# Patient Record
Sex: MALE | Race: WHITE | Employment: FULL TIME | URBAN - NONMETROPOLITAN AREA
[De-identification: names, ages, dates, MRNs, and addresses within clinical notes are randomized per-mention and may not be internally consistent; named-entity substitution may affect disease eponyms.]

---

## 2022-07-21 ENCOUNTER — HOSPITAL ENCOUNTER (EMERGENCY)
Age: 47
Discharge: HOME | End: 2022-07-21
Attending: EMERGENCY MEDICINE
Payer: COMMERCIAL

## 2022-07-21 VITALS
SYSTOLIC BLOOD PRESSURE: 146 MMHG | HEART RATE: 90 BPM | TEMPERATURE: 97.8 F | DIASTOLIC BLOOD PRESSURE: 90 MMHG | OXYGEN SATURATION: 100 % | RESPIRATION RATE: 18 BRPM

## 2022-07-21 DIAGNOSIS — R55 PRE-SYNCOPE: Primary | ICD-10-CM

## 2022-07-21 PROCEDURE — 99282 EMERGENCY DEPT VISIT SF MDM: CPT

## 2022-07-21 ASSESSMENT — ENCOUNTER SYMPTOMS
SHORTNESS OF BREATH: 0
BACK PAIN: 0
SORE THROAT: 0
ABDOMINAL DISTENTION: 0

## 2022-07-21 NOTE — ED PROVIDER NOTES
[facility]  EMERGENCY DEPARTMENT ENCOUNTER      Pt Name: Rina English  MRN: [id number]  [place] 1975  Date of evaluation: 7/21/2022  Provider: Jennifer Manning DO    CHIEF COMPLAINT       Chief Complaint   Patient presents with    Loss of Consciousness     Was fasting for blood work at work [employer]) and afterwards had a syncopal episode, did not hit head or cause any known injury. HISTORY OF PRESENT ILLNESS   (Location/Symptom, Timing/Onset, Context/Setting, Quality, Duration, Modifying Factors, Severity)  Note limiting factors. Rina English is a 56 y.o. male who presents to the emergency department after he had a presyncopal episode while at work. Patient states that he was fasting for 19 hours for a blood draw related to their company's health insurance and was scheduled to have his blood drawn at 10:45 PM earlier tonight. He states that the blood draw went fine and he actually ate a meal after that but when he went back to go sit on his desk he had some tunnel vision he got a little sweaty and dizzy and almost passed out. He did not hit his head or have any other injuries. They gave him a lot of Gatorade and some candy and now he feels better. Patient thinks that it might be his blood sugars going low that could have caused this. He also tells me that the environment he works and is quite warm. He states that he has been drinking a lot of water throughout the day however. He denies any chest pain, shortness of breath, headache or dizziness at this time. He feels back to his baseline and would like to be discharged home. REVIEW OF SYSTEMS    (2-9 systems for level 4, 10 or more for level 5)     Review of Systems   Constitutional:  Negative for fatigue and fever. HENT:  Negative for congestion and sore throat. Eyes:  Negative for visual disturbance. Respiratory:  Negative for shortness of breath.     Cardiovascular:  Negative for chest pain and palpitations. Gastrointestinal:  Negative for abdominal distention. Genitourinary:  Negative for dysuria. Musculoskeletal:  Negative for back pain. Skin:  Negative for rash. Neurological:  Positive for syncope. Negative for dizziness. Psychiatric/Behavioral:  Negative for suicidal ideas. Except as noted above the remainder of the review of systems was reviewed and negative. PAST MEDICAL HISTORY   No past medical history on file. SURGICAL HISTORY     No past surgical history on file. CURRENT MEDICATIONS       Previous Medications    No medications on file       ALLERGIES     Patient has no known allergies. FAMILY HISTORY     No family history on file. SOCIAL HISTORY              PHYSICAL EXAM    (up to 7 for level 4, 8 or more for level 5)     ED Triage Vitals [07/21/22 0057]   BP Temp Temp Source Heart Rate Resp SpO2 Height Weight   (!) 146/90 97.8 °F (36.6 °C) Tympanic 90 18 100 % -- --       Physical Exam  Constitutional:       General: He is not in acute distress. Appearance: Normal appearance. He is not toxic-appearing. HENT:      Head: Normocephalic and atraumatic. Mouth/Throat:      Mouth: Mucous membranes are moist.   Eyes:      Extraocular Movements: Extraocular movements intact. Pupils: Pupils are equal, round, and reactive to light. Cardiovascular:      Rate and Rhythm: Normal rate and regular rhythm. Pulses: Normal pulses. Heart sounds: Normal heart sounds. Pulmonary:      Effort: Pulmonary effort is normal.      Breath sounds: Normal breath sounds. Abdominal:      General: Abdomen is flat. Bowel sounds are normal.      Palpations: Abdomen is soft. Musculoskeletal:         General: Normal range of motion. Skin:     General: Skin is warm and dry. Capillary Refill: Capillary refill takes less than 2 seconds. Neurological:      General: No focal deficit present.       Mental Status: He is alert and oriented to person, place, and time. Psychiatric:         Mood and Affect: Mood normal.       DIAGNOSTIC RESULTS       EMERGENCY DEPARTMENT COURSE and DIFFERENTIAL DIAGNOSIS/MDM:   Vitals:    Vitals:    07/21/22 0057   BP: (!) 146/90   Pulse: 90   Resp: 18   Temp: 97.8 °F (36.6 °C)   TempSrc: Tympanic   SpO2: 100%       REASSESSMENT      Patient denies any concern at this time. Given the fact that the patient was fasting for 19 hours this episode is likely related to that. He feels back to his baseline and does not want any blood work done as he just had blood drawn earlier tonight. He feels safe to go home. I did encourage him to continue to drink plenty of fluids. He understands and has no other questions or concerns. Advised him to return if he has any worsening symptoms. FINAL IMPRESSION      1.  Pre-syncope          DISPOSITION/PLAN   DISPOSITION Decision To Discharge 07/21/2022 01:20:43 AM      PATIENT REFERRED TO:  Primary care doctor      As needed      (Please note that portions of this note were completed with a voice recognition program.  Efforts were made to edit the dictations but occasionally words are mis-transcribed.)    Vanessa Figueredo DO (electronically signed)  Attending Emergency Physician            Vanessa Figueredo DO  07/21/22 2262